# Patient Record
Sex: FEMALE | Race: WHITE | NOT HISPANIC OR LATINO | Employment: OTHER | URBAN - METROPOLITAN AREA
[De-identification: names, ages, dates, MRNs, and addresses within clinical notes are randomized per-mention and may not be internally consistent; named-entity substitution may affect disease eponyms.]

---

## 2020-12-21 ENCOUNTER — IMPORTED ENCOUNTER (OUTPATIENT)
Dept: URBAN - METROPOLITAN AREA CLINIC 9 | Facility: CLINIC | Age: 75
End: 2020-12-21

## 2020-12-31 ENCOUNTER — IMPORTED ENCOUNTER (OUTPATIENT)
Dept: URBAN - METROPOLITAN AREA CLINIC 9 | Facility: CLINIC | Age: 75
End: 2020-12-31

## 2021-01-20 ENCOUNTER — IMPORTED ENCOUNTER (OUTPATIENT)
Dept: URBAN - METROPOLITAN AREA CLINIC 9 | Facility: CLINIC | Age: 76
End: 2021-01-20

## 2021-01-28 ENCOUNTER — IMPORTED ENCOUNTER (OUTPATIENT)
Dept: URBAN - METROPOLITAN AREA CLINIC 9 | Facility: CLINIC | Age: 76
End: 2021-01-28

## 2021-02-15 ENCOUNTER — IMPORTED ENCOUNTER (OUTPATIENT)
Dept: URBAN - METROPOLITAN AREA CLINIC 9 | Facility: CLINIC | Age: 76
End: 2021-02-15

## 2021-03-04 ENCOUNTER — IMPORTED ENCOUNTER (OUTPATIENT)
Dept: URBAN - METROPOLITAN AREA CLINIC 9 | Facility: CLINIC | Age: 76
End: 2021-03-04

## 2021-04-01 ENCOUNTER — IMPORTED ENCOUNTER (OUTPATIENT)
Dept: URBAN - METROPOLITAN AREA CLINIC 9 | Facility: CLINIC | Age: 76
End: 2021-04-01

## 2021-04-29 ENCOUNTER — IMPORTED ENCOUNTER (OUTPATIENT)
Dept: URBAN - METROPOLITAN AREA CLINIC 9 | Facility: CLINIC | Age: 76
End: 2021-04-29

## 2021-06-10 ENCOUNTER — IMPORTED ENCOUNTER (OUTPATIENT)
Dept: URBAN - METROPOLITAN AREA CLINIC 9 | Facility: CLINIC | Age: 76
End: 2021-06-10

## 2021-07-30 ENCOUNTER — IMPORTED ENCOUNTER (OUTPATIENT)
Dept: URBAN - METROPOLITAN AREA CLINIC 9 | Facility: CLINIC | Age: 76
End: 2021-07-30

## 2021-08-02 NOTE — PROCEDURE NOTE: CLINICAL
PROCEDURE NOTE: Punctal Plugs, Zac Duncan ([id number], [id number]) #2 Bilateral Lower Lids. Diagnosis: Dry Eye Syndrome. Prior to treatment, the risks/benefits/alternatives were discussed. The patient wished to proceed with procedure. Temporary collagen plugs were inserted. Patient tolerated procedure well. There were no complications. Post procedure instructions given. Elizabeth Hope

## 2021-08-09 NOTE — PATIENT DISCUSSION
PP .5 LLL / 8/2/21RLL . 5 UNABLRE TO POSITION INTO CANAL. POSITIONED IN LOWER PUNCTUM(DISPLACED) 8/9/21 .4 RUL.

## 2021-08-31 ENCOUNTER — IMPORTED ENCOUNTER (OUTPATIENT)
Dept: URBAN - METROPOLITAN AREA CLINIC 9 | Facility: CLINIC | Age: 76
End: 2021-08-31

## 2021-09-20 NOTE — PROCEDURE NOTE: CLINICAL
PROCEDURE NOTE: Punctal Plugs, Awilda English ([id number], [id number]) #1 Left Upper Lid. Diagnosis: Lagophthalmos. Prior to treatment, the risks/benefits/alternatives were discussed. The patient wished to proceed with procedure. Temporary collagen plugs were inserted. Patient tolerated procedure well. There were no complications. Post procedure instructions given. Kait Calhoun

## 2021-09-20 NOTE — PATIENT DISCUSSION
PP .5 LLL / 8/2/21RLL . 5 UNABLRE TO POSITION INTO CANAL. POSITIONED IN LOWER PUNCTUM(DISPLACED) 8/9/21 .4 RUL. /.4 LLL 9/20/21.

## 2021-10-05 NOTE — PATIENT DISCUSSION
10/5/21 JOD: There is an element of floppy eyelid OU as well as lagopthalmos OU. Recommend patient use ointment QHS OU and to sleep with moisture chamber goggles (recommend Eyeseals 4.0). Mild floppiness to eyelids OU.

## 2021-10-05 NOTE — PATIENT DISCUSSION
10/5/21 JOD: Educated patient on condition and the effect on vision. Recommend warm compresses with Oasis Mel Mask qhs OU. Also discussed iLux treatment with patient. Educated patient on OOP cost. Patient wishes to proceed with iLux OU today. Patient already has plugs in place. Sample of TobraDex ST given to patient to use BID OU until  bottle runs out. Follow up with JOD in 3-4 weeks.

## 2021-10-05 NOTE — PATIENT DISCUSSION
10/5/21 JOD: Dryness noted on inferior 1/3, consistent with floppy eyelids and lagophthalmos OU. Recommend Oasis Tears Plus 2-4x/day (sample given). See floppy eyelid imp/plan for further treatment.

## 2021-10-05 NOTE — PROCEDURE NOTE: CLINICAL
PROCEDURE NOTE: iLUX MGD Treatment OU. Diagnosis: Meibomian Gland Dysfunction. The iLUX Device is indicated for the application of heat and pressure therapy in adult patients with chronic disease of the eyelids, including Meibomian Gland Dysfunction (MGD), also known as evaporative dry eye. Potential adverse reactions include eyelid/eye pain, irritation, and inflammation, as well as ocular surface irritation and inflammation. After the risks, benefits, and alternatives were explained, the patient decided to undergo the treatment and informed consent was obtained. A drop of Proparacaine Hydrochloride 0.5% was used to anesthetize the ocular surfaces and the treatment was performed successfully on the upper and lower eyelids of both eyes. An antibiotic and steroid drop was instilled in each treated eye at the conclusion of the case. The patient tolerated the procedure well and there were no complications. Anitra Rodriguez

## 2021-10-16 ASSESSMENT — VISUAL ACUITY
OS_PH: 20/40 - SN
OD_SC: 20/400 SN
OS_SC: 20/50 - SN
OD_SC: 20/100 SN
OS_SC: 20/40 -2 SN
OD_CC: 20/100 SN
OS_CC: 20/40 + SN
OS_SC: 20/80 SN
OS_SC: 20/40 - SN
OD_PH: 20/40 +2 SN
OS_SC: 20/50 -2 SN
OS_PH: 20/40 -2 SN
OS_SC: 20/40 -2 SN
OS_SC: 20/70 SN
OD_SC: 20/400 SN
OS_SC: 20/40 -3 SN
OS_SC: 20/70 SN
OS_SC: 20/50 -2 SN

## 2021-10-16 ASSESSMENT — TONOMETRY
OD_IOP_MMHG: 37
OS_IOP_MMHG: 14
OD_IOP_MMHG: 25
OD_IOP_MMHG: 29
OS_IOP_MMHG: 25
OD_IOP_MMHG: 27
OD_IOP_MMHG: 25
OD_IOP_MMHG: 25
OS_IOP_MMHG: 21
OD_IOP_MMHG: 23
OD_IOP_MMHG: 25
OS_IOP_MMHG: 19
OS_IOP_MMHG: 20
OS_IOP_MMHG: 18
OD_IOP_MMHG: 21
OD_IOP_MMHG: 50
OS_IOP_MMHG: 23
OS_IOP_MMHG: 19
OD_IOP_MMHG: 16
OS_IOP_MMHG: 17
OS_IOP_MMHG: 22

## 2021-10-16 ASSESSMENT — PACHYMETRY
OS_CT_UM: 534.0
OD_CT_UM: 649.0

## 2021-11-01 NOTE — PATIENT DISCUSSION
11/01/2021: Discussed the importance of not rubbing or touching/picking at the eye. Patient still complains of grittiness. Recommend Eye eco moisture chamber goggles although patient does not think she can sleep at night with goggles on. Will prescribe Cequa BID OU.

## 2021-12-07 ENCOUNTER — PREPPED CHART (OUTPATIENT)
Dept: URBAN - METROPOLITAN AREA CLINIC 14 | Facility: CLINIC | Age: 76
End: 2021-12-07

## 2021-12-30 ENCOUNTER — ESTABLISHED PATIENT (OUTPATIENT)
Dept: URBAN - METROPOLITAN AREA CLINIC 5 | Facility: CLINIC | Age: 76
End: 2021-12-30

## 2021-12-30 DIAGNOSIS — H40.1421: ICD-10-CM

## 2021-12-30 DIAGNOSIS — H40.1413: ICD-10-CM

## 2021-12-30 DIAGNOSIS — Z96.1: ICD-10-CM

## 2021-12-30 DIAGNOSIS — H18.231: ICD-10-CM

## 2021-12-30 PROCEDURE — 99214 OFFICE O/P EST MOD 30 MIN: CPT

## 2021-12-30 PROCEDURE — 92133 CPTRZD OPH DX IMG PST SGM ON: CPT

## 2021-12-30 ASSESSMENT — TONOMETRY
OS_IOP_MMHG: 24
OD_IOP_MMHG: 24

## 2021-12-30 ASSESSMENT — VISUAL ACUITY: OS_SC: 20/40

## 2022-02-28 NOTE — PATIENT DISCUSSION
10/5/21 JOD: Dryness noted on inferior 1/3, consistent with floppy eyelids and lagophthalmos OU. Recommend Oasis Tears Plus 2-4x/day (sample given). See floppy eyelid imp/plan for further treatment. Xray Chest 1 View- PORTABLE-Urgent

## 2022-03-21 ENCOUNTER — TECH ONLY (OUTPATIENT)
Dept: URBAN - METROPOLITAN AREA CLINIC 15 | Facility: CLINIC | Age: 77
End: 2022-03-21

## 2022-03-21 DIAGNOSIS — H40.1421: ICD-10-CM

## 2022-03-21 DIAGNOSIS — H40.1413: ICD-10-CM

## 2022-03-21 PROCEDURE — 99211T TECH SERVICE

## 2022-03-21 ASSESSMENT — TONOMETRY
OD_IOP_MMHG: 22
OS_IOP_MMHG: 23

## 2022-05-04 NOTE — PATIENT DISCUSSION
Advised regular use of Amsler grid.
Continue preservative free tears to use as directed.
Discussed the use of warm compresses.
Glasses Prescription given to patient. 1ST PAL.
Good postoperative appearance.
Patient understands condition, prognosis and need for follow up care.
[place].
Recommended observation.
Recommended warm compresses.
Home

## 2022-08-16 ENCOUNTER — COMPREHENSIVE EXAM (OUTPATIENT)
Dept: URBAN - METROPOLITAN AREA CLINIC 16 | Facility: CLINIC | Age: 77
End: 2022-08-16

## 2022-08-16 DIAGNOSIS — H00.12: ICD-10-CM

## 2022-08-16 PROCEDURE — 99213 OFFICE O/P EST LOW 20 MIN: CPT

## 2022-08-16 ASSESSMENT — TONOMETRY
OD_IOP_MMHG: 19
OS_IOP_MMHG: 21

## 2022-09-12 ENCOUNTER — ESTABLISHED PATIENT (OUTPATIENT)
Dept: URBAN - METROPOLITAN AREA CLINIC 15 | Facility: CLINIC | Age: 77
End: 2022-09-12

## 2022-09-12 DIAGNOSIS — H40.1413: ICD-10-CM

## 2022-09-12 DIAGNOSIS — H40.1421: ICD-10-CM

## 2022-09-12 PROCEDURE — 92083 EXTENDED VISUAL FIELD XM: CPT

## 2022-09-12 PROCEDURE — 92014 COMPRE OPH EXAM EST PT 1/>: CPT

## 2022-09-12 ASSESSMENT — VISUAL ACUITY: OS_SC: 20/40

## 2022-09-12 ASSESSMENT — TONOMETRY
OD_IOP_MMHG: 21
OS_IOP_MMHG: 17

## 2024-07-19 ENCOUNTER — COMPREHENSIVE EXAM (OUTPATIENT)
Dept: URBAN - METROPOLITAN AREA CLINIC 14 | Facility: CLINIC | Age: 79
End: 2024-07-19

## 2024-07-19 ASSESSMENT — TONOMETRY
OS_IOP_MMHG: 26
OD_IOP_MMHG: 40
OS_IOP_MMHG: 25
OD_IOP_MMHG: 43

## 2024-07-19 ASSESSMENT — KERATOMETRY
OS_K2POWER_DIOPTERS: 44.25
OS_K1POWER_DIOPTERS: 44
OS_AXISANGLE_DEGREES: 149
OS_AXISANGLE2_DEGREES: 59

## 2024-07-19 ASSESSMENT — VISUAL ACUITY
OS_SC: 20/200
OS_PH: 20/60-1
OS_CC: 20/60-2

## 2024-07-29 ENCOUNTER — COMPREHENSIVE EXAM (OUTPATIENT)
Dept: URBAN - METROPOLITAN AREA CLINIC 14 | Facility: CLINIC | Age: 79
End: 2024-07-29

## 2024-07-29 DIAGNOSIS — H40.1413: ICD-10-CM

## 2024-07-29 DIAGNOSIS — H43.01: ICD-10-CM

## 2024-07-29 DIAGNOSIS — H27.131: ICD-10-CM

## 2024-07-29 DIAGNOSIS — T85.29XA: ICD-10-CM

## 2024-07-29 DIAGNOSIS — H40.1421: ICD-10-CM

## 2024-07-29 PROCEDURE — 99214 OFFICE O/P EST MOD 30 MIN: CPT

## 2024-07-29 ASSESSMENT — KERATOMETRY
OS_AXISANGLE2_DEGREES: 59
OS_K2POWER_DIOPTERS: 44.25
OS_AXISANGLE_DEGREES: 149
OS_K1POWER_DIOPTERS: 44

## 2024-07-29 ASSESSMENT — TONOMETRY
OS_IOP_MMHG: 18
OD_IOP_MMHG: 33

## 2024-07-29 ASSESSMENT — VISUAL ACUITY: OS_SC: 20/70

## 2024-08-27 ENCOUNTER — SURGERY/PROCEDURE (OUTPATIENT)
Dept: URBAN - METROPOLITAN AREA SURGERY 6 | Facility: SURGERY | Age: 79
End: 2024-08-27

## 2024-08-27 DIAGNOSIS — H43.01: ICD-10-CM

## 2024-08-27 DIAGNOSIS — H27.131: ICD-10-CM

## 2024-08-27 DIAGNOSIS — T85.29XA: ICD-10-CM

## 2024-08-27 PROCEDURE — 67121 REMOVE EYE IMPLANT MATERIAL: CPT

## 2024-08-28 ENCOUNTER — POST-OP (OUTPATIENT)
Facility: LOCATION | Age: 79
End: 2024-08-28

## 2024-08-28 DIAGNOSIS — T85.29XA: ICD-10-CM

## 2024-08-28 DIAGNOSIS — H43.01: ICD-10-CM

## 2024-08-28 DIAGNOSIS — H27.131: ICD-10-CM

## 2024-08-28 PROCEDURE — 99024 POSTOP FOLLOW-UP VISIT: CPT

## 2024-08-28 ASSESSMENT — KERATOMETRY
OS_K2POWER_DIOPTERS: 44.25
OS_AXISANGLE_DEGREES: 149
OS_AXISANGLE2_DEGREES: 59
OS_K1POWER_DIOPTERS: 44

## 2024-08-28 ASSESSMENT — TONOMETRY
OD_IOP_MMHG: 12
OS_IOP_MMHG: 20

## 2024-08-28 ASSESSMENT — VISUAL ACUITY
OS_SC: 20/400
OS_PH: 20/70+1

## 2024-09-04 ENCOUNTER — POST-OP (OUTPATIENT)
Facility: LOCATION | Age: 79
End: 2024-09-04

## 2024-09-04 DIAGNOSIS — H27.131: ICD-10-CM

## 2024-09-04 DIAGNOSIS — T85.29XA: ICD-10-CM

## 2024-09-04 DIAGNOSIS — H43.01: ICD-10-CM

## 2024-09-04 PROCEDURE — 99024 POSTOP FOLLOW-UP VISIT: CPT

## 2024-09-04 ASSESSMENT — KERATOMETRY
OS_AXISANGLE2_DEGREES: 59
OS_K2POWER_DIOPTERS: 44.25
OS_K1POWER_DIOPTERS: 44
OS_AXISANGLE_DEGREES: 149

## 2024-09-12 ENCOUNTER — POST-OP (OUTPATIENT)
Dept: URBAN - METROPOLITAN AREA CLINIC 14 | Facility: CLINIC | Age: 79
End: 2024-09-12

## 2024-09-12 DIAGNOSIS — H43.01: ICD-10-CM

## 2024-09-12 DIAGNOSIS — T85.29XA: ICD-10-CM

## 2024-09-12 DIAGNOSIS — H27.131: ICD-10-CM

## 2024-09-12 PROCEDURE — 99024 POSTOP FOLLOW-UP VISIT: CPT

## 2024-09-25 ENCOUNTER — EMERGENCY VISIT (OUTPATIENT)
Facility: LOCATION | Age: 79
End: 2024-09-25

## 2024-09-25 DIAGNOSIS — S05.01XA: ICD-10-CM

## 2024-09-25 PROCEDURE — 99213 OFFICE O/P EST LOW 20 MIN: CPT | Mod: 24

## 2024-10-03 ENCOUNTER — FOLLOW UP (OUTPATIENT)
Dept: URBAN - METROPOLITAN AREA CLINIC 14 | Facility: CLINIC | Age: 79
End: 2024-10-03

## 2024-10-03 DIAGNOSIS — H18.11: ICD-10-CM

## 2024-10-03 DIAGNOSIS — S05.01XA: ICD-10-CM

## 2024-10-03 PROCEDURE — 99213 OFFICE O/P EST LOW 20 MIN: CPT | Mod: 24

## 2024-10-17 ENCOUNTER — FOLLOW UP (OUTPATIENT)
Dept: URBAN - METROPOLITAN AREA CLINIC 14 | Facility: CLINIC | Age: 79
End: 2024-10-17

## 2024-10-17 DIAGNOSIS — S05.01XD: ICD-10-CM

## 2024-10-17 DIAGNOSIS — H40.1421: ICD-10-CM

## 2024-10-17 DIAGNOSIS — H40.1413: ICD-10-CM

## 2024-10-17 DIAGNOSIS — H18.11: ICD-10-CM

## 2024-10-17 PROCEDURE — 99213 OFFICE O/P EST LOW 20 MIN: CPT

## 2024-11-14 ENCOUNTER — POST-OP (OUTPATIENT)
Dept: URBAN - METROPOLITAN AREA CLINIC 14 | Facility: CLINIC | Age: 79
End: 2024-11-14

## 2024-11-14 DIAGNOSIS — H27.131: ICD-10-CM

## 2024-11-14 DIAGNOSIS — H00.12: ICD-10-CM

## 2024-11-14 DIAGNOSIS — H43.01: ICD-10-CM

## 2024-11-14 PROCEDURE — 99024 POSTOP FOLLOW-UP VISIT: CPT

## 2024-12-16 ENCOUNTER — EMERGENCY VISIT (OUTPATIENT)
Age: 79
End: 2024-12-16

## 2024-12-16 DIAGNOSIS — H16.001: ICD-10-CM

## 2024-12-16 PROCEDURE — 99213 OFFICE O/P EST LOW 20 MIN: CPT

## 2024-12-20 ENCOUNTER — FOLLOW UP (OUTPATIENT)
Age: 79
End: 2024-12-20

## 2024-12-20 DIAGNOSIS — H16.001: ICD-10-CM

## 2024-12-20 PROCEDURE — 99213 OFFICE O/P EST LOW 20 MIN: CPT

## 2024-12-30 ENCOUNTER — FOLLOW UP (OUTPATIENT)
Age: 79
End: 2024-12-30

## 2024-12-30 DIAGNOSIS — H16.001: ICD-10-CM

## 2024-12-30 PROCEDURE — 99213 OFFICE O/P EST LOW 20 MIN: CPT

## 2025-02-05 ENCOUNTER — EMERGENCY VISIT (OUTPATIENT)
Age: 80
End: 2025-02-05

## 2025-02-05 DIAGNOSIS — S05.00XA: ICD-10-CM

## 2025-02-05 PROCEDURE — 92071 CONTACT LENS FITTING FOR TX: CPT

## 2025-02-05 PROCEDURE — 99214 OFFICE O/P EST MOD 30 MIN: CPT

## 2025-02-12 ENCOUNTER — FOLLOW UP (OUTPATIENT)
Age: 80
End: 2025-02-12

## 2025-02-12 DIAGNOSIS — S05.01XD: ICD-10-CM

## 2025-02-12 PROCEDURE — 99213 OFFICE O/P EST LOW 20 MIN: CPT

## 2025-02-14 ENCOUNTER — FOLLOW UP (OUTPATIENT)
Age: 80
End: 2025-02-14

## 2025-02-14 DIAGNOSIS — H16.231: ICD-10-CM

## 2025-02-14 PROCEDURE — 99213 OFFICE O/P EST LOW 20 MIN: CPT

## 2025-02-21 ENCOUNTER — CONSULTATION/EVALUATION (OUTPATIENT)
Age: 80
End: 2025-02-21

## 2025-02-21 DIAGNOSIS — H16.231: ICD-10-CM

## 2025-02-21 PROCEDURE — 92012 INTRM OPH EXAM EST PATIENT: CPT

## 2025-02-28 ENCOUNTER — FOLLOW UP (OUTPATIENT)
Age: 80
End: 2025-02-28

## 2025-02-28 DIAGNOSIS — H16.231: ICD-10-CM

## 2025-02-28 PROCEDURE — 99213 OFFICE O/P EST LOW 20 MIN: CPT

## 2025-02-28 PROCEDURE — 92071 CONTACT LENS FITTING FOR TX: CPT

## 2025-03-07 ENCOUNTER — CONTACT LENSES/GLASSES VISIT (OUTPATIENT)
Age: 80
End: 2025-03-07

## 2025-03-07 DIAGNOSIS — H52.01: ICD-10-CM

## 2025-03-07 PROCEDURE — 92310-4 LEVEL 4 NEW SOFT LENS

## 2025-03-14 ENCOUNTER — CONTACT LENSES/GLASSES VISIT (OUTPATIENT)
Age: 80
End: 2025-03-14

## 2025-03-14 DIAGNOSIS — H16.231: ICD-10-CM

## 2025-03-14 PROCEDURE — 99213 OFFICE O/P EST LOW 20 MIN: CPT

## 2025-03-21 ENCOUNTER — FOLLOW UP (OUTPATIENT)
Age: 80
End: 2025-03-21

## 2025-03-21 DIAGNOSIS — H16.231: ICD-10-CM

## 2025-03-21 PROCEDURE — 99213 OFFICE O/P EST LOW 20 MIN: CPT

## 2025-04-02 ENCOUNTER — EMERGENCY VISIT (OUTPATIENT)
Age: 80
End: 2025-04-02

## 2025-04-02 DIAGNOSIS — H16.231: ICD-10-CM

## 2025-04-02 PROCEDURE — 99212 OFFICE O/P EST SF 10 MIN: CPT

## 2025-05-14 NOTE — PATIENT DISCUSSION
See floppy eyelid imp/plan. What Type Of Note Output Would You Prefer (Optional)?: Standard Output How Severe Are Your Spot(S)?: moderate Have Your Spot(S) Been Treated In The Past?: has not been treated Hpi Title: Evaluation of Skin Lesions Location: Left chest Year Removed: 2024

## 2025-07-17 ENCOUNTER — EMERGENCY VISIT (OUTPATIENT)
Age: 80
End: 2025-07-17

## 2025-07-17 DIAGNOSIS — H18.11: ICD-10-CM

## 2025-07-17 DIAGNOSIS — S05.01XA: ICD-10-CM

## 2025-07-17 PROCEDURE — 99214 OFFICE O/P EST MOD 30 MIN: CPT

## 2025-07-17 RX ORDER — MOXIFLOXACIN OPHTHALMIC 5 MG/ML
1 SOLUTION/ DROPS OPHTHALMIC
Start: 2025-07-17

## 2025-07-21 ENCOUNTER — FOLLOW UP (OUTPATIENT)
Age: 80
End: 2025-07-21

## 2025-07-21 DIAGNOSIS — S05.01XD: ICD-10-CM

## 2025-07-21 DIAGNOSIS — H18.11: ICD-10-CM

## 2025-07-21 PROCEDURE — 99213 OFFICE O/P EST LOW 20 MIN: CPT

## 2025-07-28 ENCOUNTER — FOLLOW UP (OUTPATIENT)
Age: 80
End: 2025-07-28

## 2025-08-04 ENCOUNTER — FOLLOW UP (OUTPATIENT)
Age: 80
End: 2025-08-04

## 2025-08-04 DIAGNOSIS — S05.01XA: ICD-10-CM

## 2025-08-04 DIAGNOSIS — H18.11: ICD-10-CM

## 2025-08-04 PROCEDURE — 92071 CONTACT LENS FITTING FOR TX: CPT

## 2025-08-04 PROCEDURE — 99214 OFFICE O/P EST MOD 30 MIN: CPT

## 2025-08-08 ENCOUNTER — FOLLOW UP (OUTPATIENT)
Age: 80
End: 2025-08-08

## 2025-08-08 DIAGNOSIS — S05.01XD: ICD-10-CM

## 2025-08-08 DIAGNOSIS — H18.11: ICD-10-CM

## 2025-08-08 PROCEDURE — 99213 OFFICE O/P EST LOW 20 MIN: CPT
